# Patient Record
Sex: MALE | Race: WHITE | Employment: FULL TIME | URBAN - METROPOLITAN AREA
[De-identification: names, ages, dates, MRNs, and addresses within clinical notes are randomized per-mention and may not be internally consistent; named-entity substitution may affect disease eponyms.]

---

## 2024-10-18 ENCOUNTER — HOSPITAL ENCOUNTER (EMERGENCY)
Age: 39
Discharge: HOME | End: 2024-10-19
Attending: EMERGENCY MEDICINE
Payer: COMMERCIAL

## 2024-10-18 VITALS
HEART RATE: 88 BPM | TEMPERATURE: 98.7 F | WEIGHT: 160 LBS | RESPIRATION RATE: 14 BRPM | DIASTOLIC BLOOD PRESSURE: 100 MMHG | SYSTOLIC BLOOD PRESSURE: 140 MMHG | OXYGEN SATURATION: 97 %

## 2024-10-18 DIAGNOSIS — Z98.890 STATUS POST MOHS SURGERY: ICD-10-CM

## 2024-10-18 DIAGNOSIS — S00.03XA HEMATOMA OF SCALP, INITIAL ENCOUNTER: Primary | ICD-10-CM

## 2024-10-18 PROCEDURE — 99284 EMERGENCY DEPT VISIT MOD MDM: CPT

## 2024-10-18 RX ORDER — ACETAMINOPHEN 325 MG/1
650 TABLET ORAL ONCE
Status: COMPLETED | OUTPATIENT
Start: 2024-10-18 | End: 2024-10-19

## 2024-10-18 ASSESSMENT — LIFESTYLE VARIABLES: HOW OFTEN DO YOU HAVE A DRINK CONTAINING ALCOHOL: NEVER

## 2024-10-19 ENCOUNTER — APPOINTMENT (OUTPATIENT)
Dept: CT IMAGING | Age: 39
End: 2024-10-19
Payer: COMMERCIAL

## 2024-10-19 PROCEDURE — 70450 CT HEAD/BRAIN W/O DYE: CPT

## 2024-10-19 PROCEDURE — 6370000000 HC RX 637 (ALT 250 FOR IP): Performed by: STUDENT IN AN ORGANIZED HEALTH CARE EDUCATION/TRAINING PROGRAM

## 2024-10-19 RX ADMIN — ACETAMINOPHEN 650 MG: 325 TABLET ORAL at 00:02

## 2024-10-19 ASSESSMENT — PAIN SCALES - GENERAL: PAINLEVEL_OUTOF10: 2

## 2024-10-19 ASSESSMENT — PAIN DESCRIPTION - DESCRIPTORS: DESCRIPTORS: ACHING;CRAMPING;DISCOMFORT

## 2024-10-19 ASSESSMENT — PAIN DESCRIPTION - LOCATION: LOCATION: FACE;HEAD

## 2024-10-19 ASSESSMENT — PAIN DESCRIPTION - ORIENTATION: ORIENTATION: LEFT;UPPER

## 2024-10-19 ASSESSMENT — PAIN - FUNCTIONAL ASSESSMENT: PAIN_FUNCTIONAL_ASSESSMENT: ACTIVITIES ARE NOT PREVENTED

## 2024-10-19 NOTE — DISCHARGE INSTRUCTIONS
Please for up with your dermatologist as well as your primary care doctor.  Please return to the hospital for any new or worsening symptoms    Intermittent cold pack may help with the swelling.  Tylenol for any discomfort, please follow directions on bottle

## 2024-10-19 NOTE — ED PROVIDER NOTES
Name: Jay Carey    MRN: [id number]     Date / Time Roomed:  10/18/2024 10:40 PM  ED Bed Assignment:  18/18    ------------------ History of Present Illness --------------------  10/18/24, Time: 10:57 PM EDT   Chief Complaint   Patient presents with    Post-op Problem     Had a cancerous lump removed 10 days ago and 45 min pta while straining to have bm the area became edematous, no headache, pain at site      HPI    Jay Carey is a 39 y.o. male, with hx of basal cell carcinoma, status post Mohs surgery 10 days ago in the left forehead, who presents to the ED today for swelling noted to the left forehead he states it started acutely this evening while straining on the toilet.  He states that after using the toilet he noticed that his left forehead was bulging underneath the surgical site.  He states that he does feel some pressure sensation however denies any headache.  He denies any weakness.  Denies any nausea vomiting.  Denies any abdominal pain, chest pain or shortness of breath.  He did speak with his dermatologist who recommended he come to the ED to get checked out.  He has not take anything for this symptoms been constant, mild to moderate in severity, no exacerbating relieving factors.  The pt denies other ROS at this time.     PCP: Jose Eduardo Hutson MD.    -------------------- PMH --------------------    Past Medical History:   has a past medical history of Basal cell carcinoma.     Surgical History:  No past surgical history on file.    Social History:       Family History:  No family history on file.    Allergies:  Albuterol    The patient’s past medical history has been reviewed.    -------------------- Current Meds --------------------  Previous Medications    ATENOLOL PO    Take by mouth    PANTOPRAZOLE SODIUM (PROTONIX PO)    Take by mouth    SERTRALINE HCL (ZOLOFT PO)    Take by mouth       The patient’s home medications have been reviewed.    -------------------- PE  RESULTS --------------------    Labs:  No results found for this visit on 10/18/24.      Radiology:   Non-plain film images such as CT, Ultrasound and MRI are read by the radiologist. Plain radiographic images are visualized and preliminarily interpreted by the ED Provider in the MDM section.    Interpretation per the Radiologist below, if available at the time of this note:    CT HEAD WO CONTRAST   Final Result   No acute intracranial abnormality.      Left frontal scalp hematoma.  RECOMMENDATIONS:           No results found.    No results found.    ------------------- NURSING NOTES & VITALS -------------------    The nursing notes within the ED encounter and vital signs were reviewed.     Vitals:    10/18/24 2211   BP: (!) 140/100   Pulse: 88   Resp: 14   Temp: 98.7 °F (37.1 °C)   SpO2: 97%        Patient Vitals for the past 8 hrs:   BP Temp Pulse Resp SpO2 Weight   10/18/24 2219 -- -- -- -- -- 72.6 kg (160 lb)   10/18/24 2211 (!) 140/100 98.7 °F (37.1 °C) 88 14 97 % --         ------------- Final Impression, Disposition & Plan ---------------    Final Impression:   1. Hematoma of scalp, initial encounter    2. Status post Mohs surgery        Disposition:  Decision To Discharge 10/19/2024 01:48:50 AM    PATIENT REFERRED TO:  Your Dermatologist    Call in 1 day  For follow up      DISCHARGE MEDICATIONS:  New Prescriptions    No medications on file            Please note that portions of this note were completed with a voice recognition program.    Efforts were made to edit the dictations but occasionally words are mis-transcribed.    Shane Romero DO (electronically signed)      ATTENDING PROVIDER ATTESTATION:     I have personally performed and/or participated in the history, exam, medical decision making, and procedures and agree with all pertinent clinical information.      I have also reviewed and agree with the past medical, family and social history unless otherwise noted.    I have discussed this patient